# Patient Record
Sex: FEMALE
[De-identification: names, ages, dates, MRNs, and addresses within clinical notes are randomized per-mention and may not be internally consistent; named-entity substitution may affect disease eponyms.]

---

## 2019-04-29 PROBLEM — Z00.00 ENCOUNTER FOR PREVENTIVE HEALTH EXAMINATION: Status: ACTIVE | Noted: 2019-04-29

## 2019-05-09 ENCOUNTER — APPOINTMENT (OUTPATIENT)
Dept: PLASTIC SURGERY | Facility: CLINIC | Age: 34
End: 2019-05-09
Payer: COMMERCIAL

## 2019-05-09 VITALS
HEART RATE: 123 BPM | DIASTOLIC BLOOD PRESSURE: 80 MMHG | SYSTOLIC BLOOD PRESSURE: 140 MMHG | HEIGHT: 64 IN | BODY MASS INDEX: 22.2 KG/M2 | WEIGHT: 130 LBS

## 2019-05-09 PROCEDURE — 99203 OFFICE O/P NEW LOW 30 MIN: CPT

## 2019-05-13 NOTE — HISTORY OF PRESENT ILLNESS
[FreeTextEntry1] : 33 y/o F referred from Olvin Rodriguez (patient) for consultation to discuss breast reduction. She c/o chronic back pain, neck pain, +bra strap grooving that she attributes to the large size of her breast. Has been evaluated by pain management, Dr. Saray Robles, NSAIDS and stretching were recommended. She did not have relief of her symptoms with NSAIDS or yoga. She is otherwise healthy. No prior breast intervention. Hx of liposuction in 2012, in abdomen, in DR. No family history of breast cancer.\par \par DD, 5'4, 130 pounds, BMI 22.3, BSA 1.63\par b/l pendulous breasts, symmetric, no scar, no masses, no nipple discharge, grade III ptosis BL\par \par Today we reviewed options for breast reduction. Nature of the surgery, risks, benefits, alternatives, expected postoperative course, recovery and long term results were reviewed. All questions answered.\par I anticipate removal of 350 grams of breast tissue from each side which is well in excess of the 207 grams predicted by the Schnur sliding scale.\par Will coordinate surgery for the near future.